# Patient Record
Sex: MALE | Race: WHITE
[De-identification: names, ages, dates, MRNs, and addresses within clinical notes are randomized per-mention and may not be internally consistent; named-entity substitution may affect disease eponyms.]

---

## 2018-10-01 ENCOUNTER — HOSPITAL ENCOUNTER (INPATIENT)
Dept: HOSPITAL 12 - ER | Age: 73
LOS: 11 days | Discharge: NURSING, REHAB OR LONG-TERM CARE | DRG: 885 | End: 2018-10-12
Attending: INTERNAL MEDICINE
Payer: MEDICARE

## 2018-10-01 VITALS — HEIGHT: 67 IN | BODY MASS INDEX: 19.3 KG/M2 | WEIGHT: 123 LBS

## 2018-10-01 VITALS — SYSTOLIC BLOOD PRESSURE: 169 MMHG | DIASTOLIC BLOOD PRESSURE: 79 MMHG

## 2018-10-01 DIAGNOSIS — E86.0: ICD-10-CM

## 2018-10-01 DIAGNOSIS — G31.9: ICD-10-CM

## 2018-10-01 DIAGNOSIS — N17.0: ICD-10-CM

## 2018-10-01 DIAGNOSIS — F03.91: ICD-10-CM

## 2018-10-01 DIAGNOSIS — I10: ICD-10-CM

## 2018-10-01 DIAGNOSIS — E87.0: ICD-10-CM

## 2018-10-01 DIAGNOSIS — L60.3: ICD-10-CM

## 2018-10-01 DIAGNOSIS — F02.81: ICD-10-CM

## 2018-10-01 DIAGNOSIS — F29: Primary | ICD-10-CM

## 2018-10-01 DIAGNOSIS — Z91.83: ICD-10-CM

## 2018-10-01 DIAGNOSIS — J45.909: ICD-10-CM

## 2018-10-01 DIAGNOSIS — I67.2: ICD-10-CM

## 2018-10-01 DIAGNOSIS — Z91.14: ICD-10-CM

## 2018-10-01 DIAGNOSIS — E44.0: ICD-10-CM

## 2018-10-01 DIAGNOSIS — Z59.0: ICD-10-CM

## 2018-10-01 LAB
ALP SERPL-CCNC: 85 U/L (ref 50–136)
ALT SERPL W/O P-5'-P-CCNC: 41 U/L (ref 16–63)
AMORPH URATE CRY URNS QL MICRO: (no result) /HPF
AMPHETAMINES UR QL SCN>1000 NG/ML: NEGATIVE
APAP SERPL-MCNC: < 2 UG/ML (ref 10–30)
APPEARANCE UR: (no result)
AST SERPL-CCNC: 16 U/L (ref 15–37)
BARBITURATES UR QL SCN: NEGATIVE
BASOPHILS # BLD AUTO: 0.1 K/UL (ref 0–8)
BASOPHILS NFR BLD AUTO: 1.1 % (ref 0–2)
BILIRUB DIRECT SERPL-MCNC: 0.1 MG/DL (ref 0–0.2)
BILIRUB SERPL-MCNC: 0.4 MG/DL (ref 0.2–1)
BILIRUB UR QL STRIP: NEGATIVE
BUN SERPL-MCNC: 29 MG/DL (ref 7–18)
CHLORIDE SERPL-SCNC: 110 MMOL/L (ref 98–107)
CO2 SERPL-SCNC: 30 MMOL/L (ref 21–32)
COCAINE UR QL SCN: NEGATIVE
COLOR UR: YELLOW
CREAT SERPL-MCNC: 1.2 MG/DL (ref 0.6–1.3)
DEPRECATED SQUAMOUS URNS QL MICRO: (no result) /HPF
EOSINOPHIL # BLD AUTO: 0.2 K/UL (ref 0–0.7)
EOSINOPHIL NFR BLD AUTO: 2.7 % (ref 0–7)
ETHANOL SERPL-MCNC: < 3 MG/DL (ref 0–0)
GLUCOSE SERPL-MCNC: 91 MG/DL (ref 74–106)
GLUCOSE UR STRIP-MCNC: NEGATIVE MG/DL
HCT VFR BLD AUTO: 42.1 % (ref 36.7–47.1)
HGB BLD-MCNC: 14 G/DL (ref 12.5–16.3)
KETONES UR STRIP-MCNC: (no result) MG/DL
LEUKOCYTE ESTERASE UR QL STRIP: NEGATIVE
LYMPHOCYTES # BLD AUTO: 1.4 K/UL (ref 20–40)
LYMPHOCYTES NFR BLD AUTO: 20.5 % (ref 20.5–51.5)
MCH RBC QN AUTO: 30.2 UUG (ref 23.8–33.4)
MCHC RBC AUTO-ENTMCNC: 33 G/DL (ref 32.5–36.3)
MCV RBC AUTO: 90.6 FL (ref 73–96.2)
MONOCYTES # BLD AUTO: 0.5 K/UL (ref 2–10)
MONOCYTES NFR BLD AUTO: 8.1 % (ref 0–11)
NEUTROPHILS # BLD AUTO: 4.5 K/UL (ref 1.8–8.9)
NEUTROPHILS NFR BLD AUTO: 67.6 % (ref 38.5–71.5)
NITRITE UR QL STRIP: NEGATIVE
OPIATES UR QL SCN: NEGATIVE
PCP UR QL SCN>25 NG/ML: NEGATIVE
PH UR STRIP: 7 [PH] (ref 5–8)
PLATELET # BLD AUTO: 215 K/UL (ref 152–348)
POTASSIUM SERPL-SCNC: 4.3 MMOL/L (ref 3.5–5.1)
PROT UR QL STRIP: NEGATIVE
RBC # BLD AUTO: 4.64 MIL/UL (ref 4.06–5.63)
RBC #/AREA URNS HPF: (no result) /HPF (ref 0–3)
SP GR UR STRIP: 1.02 (ref 1–1.03)
THC UR QL SCN>50 NG/ML: NEGATIVE
TSH SERPL DL<=0.005 MIU/L-ACNC: 1.09 MIU/ML (ref 0.36–3.74)
UROBILINOGEN UR STRIP-MCNC: 1 E.U./DL
WBC # BLD AUTO: 6.6 K/UL (ref 3.6–10.2)
WBC #/AREA URNS HPF: (no result) /HPF
WBC #/AREA URNS HPF: (no result) /HPF (ref 0–3)
WS STN SPEC: 7.1 G/DL (ref 6.4–8.2)

## 2018-10-01 PROCEDURE — G0480 DRUG TEST DEF 1-7 CLASSES: HCPCS

## 2018-10-01 PROCEDURE — A4663 DIALYSIS BLOOD PRESSURE CUFF: HCPCS

## 2018-10-01 SDOH — ECONOMIC STABILITY - HOUSING INSECURITY: HOMELESSNESS: Z59.0

## 2018-10-01 NOTE — NUR
Patient ate soft dinner tray with good appetite.   Guerrero Peace 
is at bedside. 1;1 sitter observation mainained. U nurse Lulu accepted 
nursing hands off report.

## 2018-10-01 NOTE — NUR
2nd call for report as informed by Lulu Rn. hold transfer for now staff not 
ready to receive pt. Dr. Guadarrama, Supervisor, and charge notified.

## 2018-10-02 VITALS — SYSTOLIC BLOOD PRESSURE: 154 MMHG | DIASTOLIC BLOOD PRESSURE: 80 MMHG

## 2018-10-02 VITALS — DIASTOLIC BLOOD PRESSURE: 76 MMHG | SYSTOLIC BLOOD PRESSURE: 154 MMHG

## 2018-10-02 LAB
CHOLEST SERPL-MCNC: 151 MG/DL (ref ?–200)
GLUCOSE SERPL-MCNC: 102 MG/DL (ref 70–115)
HDLC SERPL-MCNC: 61 MG/DL (ref 40–60)
TRIGL SERPL-MCNC: 80 MG/DL (ref 30–150)

## 2018-10-02 PROCEDURE — 0HBRXZZ EXCISION OF TOE NAIL, EXTERNAL APPROACH: ICD-10-PCS

## 2018-10-02 RX ADMIN — LORAZEPAM PRN MG: 0.5 TABLET ORAL at 14:09

## 2018-10-02 NOTE — NUR
Received pt to care, pt sitting in the hallway in a wheelchair, pt brought in by police for 
"walking into traffic at the [facility] airport, pt clothes was soiled, pt stated that he was 
hungry, pt told officers that he was looking for his cat but officers observed no cat 
around". Pt was offered a sandwich and juice, pt ate 100%. Pt signed all admin paperwork, pt 
A&O x2, pt frail, unkempt, unstable gait, not in contact with any family. Pt states that he 
has 2 children but he does not have their phone number but he is able to provide their full 
name. Pt states that he is aware that he walked into traffic at [employer] and shrugs his 
shoulders signifying to writer that he did not care. Pt states that he has a cat named 
Erasmo who he was looking for at the airport. When writer inquired about incongruent 
statement that pt made during the interview that his cat , pt giggled and said that 
Erasmo "came back from the dead" Pt is disoriented and hallucinating. During physical 
assessment pt showed writer apparent scratches on his back that he states that he received 
from his cat Erasmo. Pt skin looks dry as if he scratches it. It is minor. Pt states that he 
has fallen twice in the last month but denies that he has trouble walking stating that "the 
ground was uneven. Pt forgets his limitations and is constantly trying to get up out of his 
wheelchair or vince-chair without asking for assistance. Pt did not want to sleep in his room 
because the bed alarm was on. Pt also wanted to sleep with the light on and his roommate 
resisted. Pt asked to sleep in the vince-chair. Pt denies having any trouble sleeping. Pt 
refused PRN sleep med and anxiety med. Pt complains about cataracts and stated that it 
contributes to him falling. No other medical history or medication information as the 
patient is homeless. Pt req info about advance directive and order placed for social 
services. Pt refuses pneumo and flu vaccine stating that he does not need or want it. Pt 
cooperative, I will continue to monitor.

## 2018-10-02 NOTE — NUR
Initial Discharge Instructions:  Patient is currently homeless and stated that "[facility] is my 
home." Patient reports that he does not want to remain homeless upon discharge. SW educated 
patient about placement options of SNF vs [facility]. Patient was agreeable to either option 
stating, "I want to do what the doctor recommends." SW will continue to collaborate with pt 
and MD regarding most appropriate discharge plans for this patient. SW will form a safe and 
proper discharge.

## 2018-10-03 VITALS — DIASTOLIC BLOOD PRESSURE: 77 MMHG | SYSTOLIC BLOOD PRESSURE: 148 MMHG

## 2018-10-03 VITALS — DIASTOLIC BLOOD PRESSURE: 97 MMHG | SYSTOLIC BLOOD PRESSURE: 180 MMHG

## 2018-10-03 VITALS — DIASTOLIC BLOOD PRESSURE: 72 MMHG | SYSTOLIC BLOOD PRESSURE: 142 MMHG

## 2018-10-03 RX ADMIN — LORAZEPAM PRN MG: 0.5 TABLET ORAL at 20:32

## 2018-10-03 RX ADMIN — TEMAZEPAM PRN MG: 7.5 CAPSULE ORAL at 21:36

## 2018-10-03 RX ADMIN — LORAZEPAM PRN MG: 0.5 TABLET ORAL at 19:57

## 2018-10-03 RX ADMIN — QUETIAPINE SCH MG: 25 TABLET, FILM COATED ORAL at 17:00

## 2018-10-03 NOTE — NUR
1600 PATIENT AGITATED AND CONFUSED WANTING TO GO HOME, REDIRECTED PATIENT AND OFFERED PRN 
ATIVAN 0.5MG PO BUT PATIENT REFUSED.  1640 OFFERED TO PATIENT ROUTINE SEROQUEL  25 MG TABLET 
DUE AT 1700, PSTIENT WAS VERY ANGRY AND HIT THE HAND OF THE STAFF SO HARD. PATIENT FOLLOW 
THE STAFF TO COMPUTER ROOM TRYING TO ATTACK STAFF. 1650 CALLED DR. FERRIS REGARDING PATIENT 
BEHAVIOR OUT OF CONTROL WITH EMERGENCY DRUG ORDER -CARRIED OUT.

## 2018-10-03 NOTE — NUR
RECEIVED PATIENT IN RECLINING CHAIR, VERBALLY RESPONSIVE BUT NOT CLEAR, NO S/S OF PAIN NOR 
DISCOMFORT, PATIENT HAS LITTLE EPISODES OF RESTLESS, AGITATION, STAFF ABLE TO REDIRECT 
PATIENT, ASSISTED WITH TOILETING, KEPT CLEAN AND DRY. ON 1;1 SITTER FOR SAFETY, ASSISTED 
BACK TO BED, CONT TO MONITOR.

## 2018-10-04 VITALS — DIASTOLIC BLOOD PRESSURE: 75 MMHG | SYSTOLIC BLOOD PRESSURE: 118 MMHG

## 2018-10-04 VITALS — DIASTOLIC BLOOD PRESSURE: 53 MMHG | SYSTOLIC BLOOD PRESSURE: 109 MMHG

## 2018-10-04 VITALS — SYSTOLIC BLOOD PRESSURE: 113 MMHG | DIASTOLIC BLOOD PRESSURE: 69 MMHG

## 2018-10-04 VITALS — DIASTOLIC BLOOD PRESSURE: 61 MMHG | SYSTOLIC BLOOD PRESSURE: 109 MMHG

## 2018-10-04 RX ADMIN — TEMAZEPAM PRN MG: 7.5 CAPSULE ORAL at 21:11

## 2018-10-04 RX ADMIN — QUETIAPINE SCH MG: 25 TABLET, FILM COATED ORAL at 17:47

## 2018-10-04 RX ADMIN — DONEPEZIL HYDROCHLORIDE SCH MG: 5 TABLET, FILM COATED ORAL at 21:09

## 2018-10-04 RX ADMIN — QUETIAPINE SCH MG: 25 TABLET, FILM COATED ORAL at 08:00

## 2018-10-04 NOTE — NUR
PATIENT AWAKE WITH CONFUSION NOTED, REORIENT PATIENT, NO S/S OF PAIN NOR DISCOMFORT, 
CONTINENT OF BLADDER, ASSISTED WITH TOILETING, CONT 1;1 SITTER FOR SAFETY, WITH EPISODES OF 
RESTLESSNESS BUT ABLE TO REDIRECT PATIENT, PATIENT SLEPT FOR 7 HOURS. CONT TO MONITOR.

## 2018-10-05 VITALS — DIASTOLIC BLOOD PRESSURE: 73 MMHG | SYSTOLIC BLOOD PRESSURE: 133 MMHG

## 2018-10-05 VITALS — SYSTOLIC BLOOD PRESSURE: 115 MMHG | DIASTOLIC BLOOD PRESSURE: 65 MMHG

## 2018-10-05 VITALS — DIASTOLIC BLOOD PRESSURE: 97 MMHG | SYSTOLIC BLOOD PRESSURE: 200 MMHG

## 2018-10-05 RX ADMIN — QUETIAPINE SCH MG: 25 TABLET, FILM COATED ORAL at 16:44

## 2018-10-05 RX ADMIN — DONEPEZIL HYDROCHLORIDE SCH MG: 5 TABLET, FILM COATED ORAL at 20:37

## 2018-10-05 RX ADMIN — QUETIAPINE SCH MG: 25 TABLET, FILM COATED ORAL at 09:03

## 2018-10-05 NOTE — NUR
Received pt sleeping in bed, aroused easily to verbal stimuli. No acute distress noted. 
Compliant with med. VSS. Safety measures maintained. Will continue to monitor.

## 2018-10-06 VITALS — DIASTOLIC BLOOD PRESSURE: 67 MMHG | SYSTOLIC BLOOD PRESSURE: 148 MMHG

## 2018-10-06 VITALS — DIASTOLIC BLOOD PRESSURE: 76 MMHG | SYSTOLIC BLOOD PRESSURE: 157 MMHG

## 2018-10-06 VITALS — SYSTOLIC BLOOD PRESSURE: 126 MMHG | DIASTOLIC BLOOD PRESSURE: 57 MMHG

## 2018-10-06 RX ADMIN — QUETIAPINE SCH MG: 25 TABLET, FILM COATED ORAL at 09:11

## 2018-10-06 RX ADMIN — DONEPEZIL HYDROCHLORIDE SCH MG: 5 TABLET, FILM COATED ORAL at 20:25

## 2018-10-06 RX ADMIN — QUETIAPINE SCH MG: 25 TABLET, FILM COATED ORAL at 16:38

## 2018-10-06 NOTE — NUR
Received pt to care, pt asleep in bed, pt woke up refusing a snack, refusing his medication, 
pt states that "he was tested for alzheimer", "what do you want me to go to sleep", pt is 
labile, suspicious, pt was noted walking in the hallways saying " I don't want to die" to 
himself and to staff and to other patients, pt complains of being dizzy and was redirected 
back to bed many times to lay down in order to prevent a fall. Pt offered water and saltine 
crackers but pt refused. I will continue to monitor.

## 2018-10-07 VITALS — SYSTOLIC BLOOD PRESSURE: 173 MMHG | DIASTOLIC BLOOD PRESSURE: 82 MMHG

## 2018-10-07 VITALS — DIASTOLIC BLOOD PRESSURE: 84 MMHG | SYSTOLIC BLOOD PRESSURE: 132 MMHG

## 2018-10-07 VITALS — DIASTOLIC BLOOD PRESSURE: 86 MMHG | SYSTOLIC BLOOD PRESSURE: 170 MMHG

## 2018-10-07 VITALS — SYSTOLIC BLOOD PRESSURE: 158 MMHG | DIASTOLIC BLOOD PRESSURE: 75 MMHG

## 2018-10-07 RX ADMIN — QUETIAPINE SCH MG: 25 TABLET, FILM COATED ORAL at 16:03

## 2018-10-07 RX ADMIN — DONEPEZIL HYDROCHLORIDE SCH MG: 5 TABLET, FILM COATED ORAL at 20:56

## 2018-10-07 RX ADMIN — LORAZEPAM PRN MG: 0.5 TABLET ORAL at 08:30

## 2018-10-07 RX ADMIN — QUETIAPINE SCH MG: 25 TABLET, FILM COATED ORAL at 09:00

## 2018-10-08 VITALS — DIASTOLIC BLOOD PRESSURE: 71 MMHG | SYSTOLIC BLOOD PRESSURE: 139 MMHG

## 2018-10-08 VITALS — DIASTOLIC BLOOD PRESSURE: 65 MMHG | SYSTOLIC BLOOD PRESSURE: 157 MMHG

## 2018-10-08 VITALS — SYSTOLIC BLOOD PRESSURE: 138 MMHG | DIASTOLIC BLOOD PRESSURE: 66 MMHG

## 2018-10-08 RX ADMIN — DONEPEZIL HYDROCHLORIDE SCH MG: 5 TABLET, FILM COATED ORAL at 20:36

## 2018-10-08 RX ADMIN — QUETIAPINE SCH MG: 25 TABLET, FILM COATED ORAL at 08:13

## 2018-10-09 VITALS — DIASTOLIC BLOOD PRESSURE: 80 MMHG | SYSTOLIC BLOOD PRESSURE: 153 MMHG

## 2018-10-09 VITALS — DIASTOLIC BLOOD PRESSURE: 66 MMHG | SYSTOLIC BLOOD PRESSURE: 135 MMHG

## 2018-10-09 VITALS — SYSTOLIC BLOOD PRESSURE: 124 MMHG | DIASTOLIC BLOOD PRESSURE: 64 MMHG

## 2018-10-09 RX ADMIN — HALOPERIDOL SCH MG: 2 TABLET ORAL at 17:20

## 2018-10-09 RX ADMIN — DONEPEZIL HYDROCHLORIDE SCH MG: 5 TABLET, FILM COATED ORAL at 20:13

## 2018-10-09 NOTE — NUR
Discharge Planning Note:   met with patient at bedside to discuss discharge 
planning. Patient reports he is still agreeable with SNF placement. Patient expressed 
concerns about his medications and presented forgetful and suspicious as he stated, "I 
haven't seen my doctor this entire time I've been here." SW attempted to redirect informing 
him that the doctor sees him everyday and that he had agreed to try his new medication 
(Zyprexa 2.5 PO HS). Patient stated, "I don't want that medication...I want Haldol." and SW 
provided reassurance informing him that the message will be relayed to his RN and the 
Psychiatrist. SW also encouraged pt to discuss concerns with his Psychiatrist. DAIN 
collaborated with AURELIA De Leon to make aware of patient's wishes.  SW will continue to provide 
support and reassurance to the patient.

## 2018-10-10 VITALS — DIASTOLIC BLOOD PRESSURE: 72 MMHG | SYSTOLIC BLOOD PRESSURE: 147 MMHG

## 2018-10-10 VITALS — SYSTOLIC BLOOD PRESSURE: 118 MMHG | DIASTOLIC BLOOD PRESSURE: 70 MMHG

## 2018-10-10 VITALS — SYSTOLIC BLOOD PRESSURE: 142 MMHG | DIASTOLIC BLOOD PRESSURE: 75 MMHG

## 2018-10-10 RX ADMIN — HALOPERIDOL SCH MG: 2 TABLET ORAL at 08:33

## 2018-10-10 RX ADMIN — HALOPERIDOL SCH MG: 2 TABLET ORAL at 16:53

## 2018-10-10 RX ADMIN — DONEPEZIL HYDROCHLORIDE SCH MG: 5 TABLET, FILM COATED ORAL at 20:06

## 2018-10-10 NOTE — NUR
Activity Group Note: Patients were asked to draw a picture of Fall and to join in discussion 
of what Fall means to them or any memories they may have of the Fall season. 



Subjective: "I don't want to draw" [Patient made this statement when asked if she would like 
to draw a picture]



Objective: Patient appeared to be enjoying himself and company of peers. Though he did not 
want to participate in the drawing portion of the activity, he did participate in 
discussion. 



Assessment: Patient needs encouragement and support in attempting new activities.



Plan: Encourage group attendance as scheduled.  will continue to provide 
encouragement and support in helping patient engage in new activities.

## 2018-10-11 VITALS — DIASTOLIC BLOOD PRESSURE: 64 MMHG | SYSTOLIC BLOOD PRESSURE: 161 MMHG

## 2018-10-11 VITALS — SYSTOLIC BLOOD PRESSURE: 175 MMHG | DIASTOLIC BLOOD PRESSURE: 82 MMHG

## 2018-10-11 RX ADMIN — DONEPEZIL HYDROCHLORIDE SCH MG: 5 TABLET, FILM COATED ORAL at 20:04

## 2018-10-11 RX ADMIN — HALOPERIDOL SCH MG: 2 TABLET ORAL at 08:23

## 2018-10-11 RX ADMIN — HALOPERIDOL SCH MG: 2 TABLET ORAL at 17:20

## 2018-10-12 VITALS — SYSTOLIC BLOOD PRESSURE: 141 MMHG | DIASTOLIC BLOOD PRESSURE: 76 MMHG

## 2018-10-12 VITALS — DIASTOLIC BLOOD PRESSURE: 76 MMHG | SYSTOLIC BLOOD PRESSURE: 176 MMHG

## 2018-10-12 RX ADMIN — LORAZEPAM PRN MG: 0.5 TABLET ORAL at 16:28

## 2018-10-12 RX ADMIN — HALOPERIDOL SCH MG: 2 TABLET ORAL at 08:35

## 2018-10-12 NOTE — NUR
Gps/Lvn- Called [facility] spoked to Admitting  Supervisor Vidya, informed b/p 167/88 
 on his right arm, dont want to take the patient unless b/p goes down to < 150 systolic. 
Ambulance informed. Cacelled transportation at this time, will continue to recheck vital 
signs. Patient requesting manual  blood pressure check, borrowed manual b/p from ER b/p 
remains up at 180/90 , patient thinks staff taking his b/p wrong.

## 2018-10-12 NOTE — NUR
Gps/Lvn- Called [facility] , spoked to Nursing Supervisor Vidya, informed patient's 
latest b/p 150/80 and patient was given clonidine 0.1 mg po. Patient discharged in good 
spirit, no complaints noted, all belongings given back to patient.Discharged via ambulance.

## 2018-10-12 NOTE — NUR
Gps/Lvn- Called [facility], report given to Coral Dasilva(admitting Nurse) . Patient was 
well informed of his dc. plan. All belongings returned back to patient including the one 
placed in the safe. Ambulance was arranged to  patient at 1100. Patient in no 
distress, no complaints noted.

## 2018-10-12 NOTE — NUR
Gps/Lvn- Yakov Stevenson DNP was called informed of elevated B/P when ambulance  EMT checked. B/P 
181/96, per patient, he used to take cozaar 100 mg po daily. Orders received to give 
hydralazine 50 mg po now dose, and  cozaar 100 mg. po daily. Patient was informed, was 
anxious, reassured. Will rechecked b/p after med. administration.

## 2018-10-12 NOTE — NUR
Discharge Note:  Patient will be discharged to [facility] 
[[address] [place] [address]; [phone]] via ambulance. Please arrange 
an ambulance for this patient. Spoke with CJ at the facility who states they are ready to 
accept the patient today. Patient is aware and agreeable with discharge plans. Patient 
presents pleasant and cooperative and denies SI/HI. Patient will follow-up at the facility 
with Dr. Mckeon (Internist) and Dr. Butler (Psychiatrist). Patient was provided with 
outpatient mental health resources to [facility] 1-234.742.3766, [facility] 
1-974.240.2001, and the National Suicide Prevention Lifeline 1-715.919.3192.

## 2018-10-12 NOTE — NUR
GPS:   Pt.refused scheduled lab draw this morning despite explanation of importance. Stated 
"I'm leaving today,i don't want to get poked again". Endorsed to charge nurse.

## 2018-10-12 NOTE — NUR
Group Activity Note: 

GOAL: Patient will actively participate in the group activity of the day or relax in the 
activity room with fellow patients.



INTERVENTION:  invited patient to participant in the arts and craft group 
activity held from 2 -2:45 pm in the activity room.



RESPONSE: Patient expressed interest in participating in arts and crafts. Patient 
participated in the activity for about 30 minutes by doing some freehand drawing. Patient 
had minimal interaction with peers and was very cooperative during the activity.



PLAN:  will invite patient to attend the next group activity that is 
held.